# Patient Record
(demographics unavailable — no encounter records)

---

## 2024-11-19 NOTE — REVIEW OF SYSTEMS
[SOB] : shortness of breath [Anxiety] : anxiety [Under Stress] : under stress [Negative] : Heme/Lymph [Weight Loss (___ Lbs)] : [unfilled] ~Ulb weight loss [Weight Gain (___ Lbs)] : no recent weight gain

## 2024-11-19 NOTE — ASSESSMENT
[FreeTextEntry1] : ECG normal sinus rhythm at 79 normal axis intervals no significant ST or T wave changes.  ECG obtained to assist in diagnosis and management of assessed problem(s).  Lab data ------20---3/16/21---21---22--23--2024--10/1/24 Chol--142---------151---------172--------184---------114-------139------144 LDL----69----------78------------92---------99----------27---------58--------69 HDL---56-----------57-----------59---------66----------56---------53--------58 A1C--6.9-----------7.1----------6.5----------------------7.8-------7.6--------7.5 Creat. 0.70  ECHOCARDIOGRAM 2024: Normal LV thickness and systolic function, LVEF 65% No regional WMA Normal RV, LA, RA  EXERCISE STRESS TEST 2024: 1. The ECG is negative for ischemia. 2. Arrhythmias: No arrhythmia associated with stress. The patient exercised for 6 min 5 sec. achieved 7 METS which is consistent with fair exercise capacity considering age and other clinical characteristics. Normal BP and HR response  CAROTID US 2024: 1. Right: proximal ICA 20-49% stenosis. 2. Left: proximal ICA 20-49% stenosis. 3. Vertebral arteries: antegrade flow bilaterally.  Echocardiogram 3/26/19: Normal LV size and function. No significant valvular abnormalities. No overt evidence of pulmonary hypertension.  Pharmacologic stress : No inducible ischemia.  Stress test 5/10/2022: Time: 6 minutes 10 seconds (7 METS) Heart rate: 151 beats minute parens under 1%) Blood pressure: 160/80-normal EC mm upsloping nondiagnostic changes Duke score: 6: Low risk  Impression: 71-year-old female here for cardiac follow up.   1.  Hypertension: Seems adequately controlled. 2. Echocardiogram shows normal LV function and no significant VHD.  3. STress testing shows no EKG changes to suggest ischemia. Fair exercise capacity. Activity limited by chronic hip pain.  4. HX of recurrent dyspnea. most likely pulm etiology. There is no evidence of fluid overload nor of any obvious heart failure .DX'd with mild asthma and responding to inhalers. 5. Lipids reasonably controlled.  6. Carotid US shows only mild disease.  7.  Mourning appropriately for the loss of her .  Plan: 1. Continue current CV medications at current doses.   2.  Maintain valsartan as blood pressure control seems adequate.  3. Continue regular follow-ups with pulmonology.   4. We will not use a beta-blocker to treat her combined hypertension and nocturnal palpitations as her asthma is sufficient, we active enough to require an inhaler twice a day  5. Pt advised to exercise for at least 30 minutes most days of the week. Any cardiac symptoms such as chest pain, palpitations or new shortness of breath should be reported.  6. Repeat labs prior to follow-up.   Clinical follow-up in 4-6 months or sooner if needed.

## 2024-11-19 NOTE — PHYSICAL EXAM
[FreeTextEntry1] :                    Well appearing and nourished with no obvious deformities or distress.  Eyes:  No conjunctival injection and no xanthelasmas. HEENT:  Normocephalic.Normal oral mucosa. No pallor or cyanosis Neck:  No jugular venous distension. with normal A and V wave forms. No palpable adenopathy. Cardiovascular:  Normal rate and rhythm with normal S1, S2 and a grade 1/6 systolic murmur. Distal arterial pulses are normal. No significant peripheral edema. Pulmonary:  Lungs are clear to auscultation and percussion. Normal respiratory pattern without any accessory muscle use Abdomen:  Soft, non-tender ; no palpable organomegaly or masses. Extremities: No digital clubbing, cyanosis or ischemic changes. Skin:  No skin lesions, rashes, ulcers or xanthomas. Psychiatric:  Alert and oriented to person, place and time. Appropriate mood and affect.

## 2024-11-19 NOTE — HISTORY OF PRESENT ILLNESS
[FreeTextEntry1] : Patient reports feeling well overall. States asthma is well controlled.  Does not check her BP at home but tolerating CV medications well. She denies any chest pain, dizziness, syncope, near-syncope, new SOB, edema, orthopnea or PND. Chronic DURAN unchanged in over 6 months.

## 2024-11-19 NOTE — REASON FOR VISIT
[FreeTextEntry1] : RITA JAMISON is a 72 year-old F presents here for cardiac follow-up  Her medical hx is relevant for: 1. Hypertension. 2. Diabetes. 3. Hyperlipidemia.  4. A strong family history of coronary artery disease including a father with MI at 46 and a sister who  of an MI at a young age.   She is mourning the loss of her  Lynn, a longstanding patient of mine, who passed away 3 months ago at home of what sounds like sudden death.

## 2024-11-22 NOTE — HISTORY OF PRESENT ILLNESS
[FreeTextEntry1] : follow up chronic medical conditions  [de-identified] : Ms. RITA JAMISON is a 72 year female with a PMH of anxiety, HLD, DM, HLD, Asthma comes to the office for follow up of chronic medical conditions.

## 2024-11-22 NOTE — HEALTH RISK ASSESSMENT
[No] : In the past 12 months have you used drugs other than those required for medical reasons? No [Former] : Former [15-19] : 15-19 [> 15 Years] : > 15 Years

## 2025-03-28 NOTE — DISCUSSION/SUMMARY
[de-identified] : RITA JAMISON is a 70 year old female who presents with left hip pain s/p left partial hip replacement 17 yrs ago.  She has had complete cartilage erosion of the acetabulum from the hemiarthroplasty.  At this point she would benefit from revision to a total hip replacement.  The patient will be sent for inflammatory marker testing prior to surgery in order to rule out infection, although clinical suspicion is low.   A discussion was had with the patient regarding a left revision total hip replacement. Anterior and posterior exposures were discussed. We will decide ultimate approach once the CT scan is complete to evaluate femoral anteversion.   A long discussion was had with the patient as what the conversion total joint replacement would entail. A model was used to demonstrate the operation and to discuss bearing surfaces of the implants. The hospitalization and rehabilitation were discussed. The use of perioperative antibiotics and DVT prophylaxis were discussed. The risks, benefits and alternatives to surgical intervention were discussed at length with the patient. Specific risks discussed included: infection, wound breakdown, numbness and damage to nerves, tendon, muscle, arteries or other blood vessels, and the possibility of leg length discrepancy. The possibility of recurrent pain, no improvement in pain and actual worsening of the pain were also mentioned in conversation with the patient. Medical complications related to the patient's general medical health including deep vein thrombosis, pulmonary embolism, heart attack, stroke, death and other complications from anesthesia were discussed as well. The patient was told that we will take steps to minimize these risks by using sterile technique, antibiotics and DVT prophylaxis when appropriate and following the patient postoperatively in the clinic setting to monitor progress. The benefits of surgery were discussed with the patient including the potential to improve the current clinical condition through operative intervention. Alternatives to surgical intervention include continued conservative management which may yield less than optimal results in this particular patient. All questions were answered to the satisfaction of the patient. Models were used as an educational tool. We did discuss implant choices and fixation, with shared decision making with the patient.

## 2025-03-28 NOTE — HISTORY OF PRESENT ILLNESS
[de-identified] : Ms. RITA JAMISON is a 72 year old female presenting for evaluation of left hip pain, status post left THR 2007. Patient localizes her pain to the groin and posterolateral aspect of the left hip. Patient denies any falls or trauma.  She has trouble with walking and lifting her leg.  She has trouble putting on socks and shoes.  She denies any fever chills or incisional issues. Patient denies any dislocations or subluxations. Patient takes OTC NSAIDs and Tylenol without relief. She has been going to outpatient PT.  Pmhx: DM 8.1

## 2025-03-28 NOTE — PHYSICAL EXAM
[de-identified] : The patient appears well nourished  and in no apparent distress.  The patient is alert and oriented to person, place, and time.   Affect and mood appear normal. The head is normocephalic and atraumatic.  The eyes reveal normal sclera and extra ocular muscles are intact. The tongue is midline with no apparent lesions.  Skin shows normal turgor with no evidence of eczema or psoriasis.  No respiratory distress noted.  Sensation grossly intact.		  [de-identified] : Exam of the left hip shows hip external rotation of 45 degrees, hip internal rotation of 15 degrees. Her prior incision appears well healed. Left Leg is 1 inch shorter than her right. She has pain with range of motion. 5/5 motor strength bilaterally distally. Sensation intact distally.		  [de-identified] :  X-ray: AP of the pelvis and 2 views of the left hip demonstrate a left partial hip arthroplasty in stable position, with no evidence of fracture, loosening, or dislocation.  There is complete loss of the acetabular cartilage.

## 2025-05-01 NOTE — REVIEW OF SYSTEMS
[Weight Gain (___ Lbs)] : no recent weight gain [Weight Loss (___ Lbs)] : [unfilled] ~Ulb weight loss [SOB] : shortness of breath [Anxiety] : anxiety [Under Stress] : under stress [Negative] : Heme/Lymph

## 2025-05-06 NOTE — REVIEW OF SYSTEMS
[Weight Gain (___ Lbs)] : [unfilled] ~Ulb weight gain [SOB] : shortness of breath [Anxiety] : anxiety [Under Stress] : under stress [Negative] : Heme/Lymph [Weight Loss (___ Lbs)] : no recent weight loss

## 2025-05-06 NOTE — REASON FOR VISIT
[FreeTextEntry1] : RITA JAMISON is a 72 year-old F presents here for cardiac evaluation in anticipation of left hip surgery. Previously had a partial replacement and now will need a revision to a complete replacement.  Her medical hx is relevant for: 1. Hypertension. 2. Diabetes. 3. Hyperlipidemia.  4. A strong family history of coronary artery disease including a father with MI at 46 and a sister who  of an MI at a young age.   She is mourning the loss of her  Lynn, a longstanding patient of mine, who passed away in  at home of what sounds like sudden death.

## 2025-05-06 NOTE — ASSESSMENT
[FreeTextEntry1] :  ECG normal sinus rhythm at 75 normal axis intervals no significant ST or T wave changes.  ECG obtained to assist in diagnosis and management of assessed problem(s).  Lab data ------20---3/16/21---21---22--23--2024--10/1/24--25 Chol--142---------151---------172--------184---------114-------139------144--------177 LDL----69----------78------------92---------99----------27---------58--------69---------69 HDL---56-----------57-----------59---------66----------56---------53--------58---------88 A1C--6.9-----------7.1----------6.5----------------------7.8-------7.6--------7.5--------108 Creat. 0.70  ECHOCARDIOGRAM 2024: Normal LV thickness and systolic function, LVEF 65% No regional WMA Normal RV, LA, RA  EXERCISE STRESS TEST 2024: 1. The ECG is negative for ischemia. 2. Arrhythmias: No arrhythmia associated with stress. The patient exercised for 6 min 5 sec. achieved 7 METS which is consistent with fair exercise capacity considering age and other clinical characteristics. Normal BP and HR response  CAROTID US 2024: 1. Right: proximal ICA 20-49% stenosis. 2. Left: proximal ICA 20-49% stenosis. 3. Vertebral arteries: antegrade flow bilaterally.  Echocardiogram 3/26/19: Normal LV size and function. No significant valvular abnormalities. No overt evidence of pulmonary hypertension.  Pharmacologic stress : No inducible ischemia.  Stress test 5/10/2022: Time: 6 minutes 10 seconds (7 METS) Heart rate: 151 beats minute parens under 1%) Blood pressure: 160/80-normal EC mm upsloping nondiagnostic changes Duke score: 6: Low risk  Impression: 72-year-old female here for cardiac evaluation prior to a left hip surgical procedure  1.  Hypertension: Seems adequately controlled. 2. Echocardiogram shows normal LV function and no significant VHD.  3. STress testing shows no EKG changes to suggest ischemia. Fair exercise capacity. Activity limited by chronic hip pain.  4. HX of recurrent dyspnea. most likely pulm etiology. There is no evidence of fluid overload nor of any obvious heart failure .DX'd with mild asthma and responding to inhalers. 5. Lipids reasonably controlled.  6. Carotid US shows only mild disease.  7.  Mourning appropriately for the loss of her .  Plan: 1.  There is no cardiac contraindication to proceeding with a left hip surgery.  2.  The morning of surgery valsartan will be taken with a sip of water .  3. Continue regular follow-ups with pulmonology.   4. We will not use a beta-blocker to treat her combined hypertension and nocturnal palpitations as her asthma is sufficient, we active enough to require an inhaler twice a day  5.  Repeat labs prior to follow-up.   Clinical follow-up in 6 months or sooner if needed.

## 2025-05-09 NOTE — HISTORY OF PRESENT ILLNESS
[de-identified] : Ms. RITA JAMISON is a 72 year old female presenting for evaluation of right knee pain and swelling. Patient localizes the pain laterally. She notes this began 3 weeks ago without injury. She notes swelling has gone down over that time period. When it first began, she has calf pain and swelling as well which has now resolved. Patient has not had injections. She has tried PT and NSAIDs without improvement.

## 2025-05-09 NOTE — PHYSICAL EXAM
[de-identified] :  The patient appears well nourished and in no apparent distress. The patient is alert and oriented to person, place, and time. Affect and mood appear normal. The head is normocephalic and atraumatic. The eyes reveal normal sclera and extra ocular muscles are intact. The tongue is midline with no apparent lesions. Skin shows normal turgor with no evidence of eczema or psoriasis. No respiratory distress noted. Sensation grossly intact. [de-identified] :  Exam of the right knee shows no palpable abnormality. 5/5 motor strength bilaterally distally. Sensation intact distally. [de-identified] : X-ray: 4 views of the right knee demonstrate moderate varus arthritis

## 2025-05-09 NOTE — DISCUSSION/SUMMARY
[de-identified] :  RITA JAMISON is a 72 year old female who presents with right knee moderate varus arthritis. We discussed that her pain was caused by a flareup of her arthritis. It is now improved.  She will continue to take NSAIDs as needed.

## 2025-06-06 NOTE — PHYSICAL EXAM
[No Acute Distress] : no acute distress [Normal Appearance] : normal appearance [No Neck Mass] : no neck mass [Normal Rate/Rhythm] : normal rate/rhythm [Normal S1, S2] : normal s1, s2 [No Murmurs] : no murmurs [No Resp Distress] : no resp distress [Clear to Auscultation Bilaterally] : clear to auscultation bilaterally [No Abnormalities] : no abnormalities [Benign] : benign [Normal Gait] : normal gait [No Clubbing] : no clubbing [No Cyanosis] : no cyanosis [No Edema] : no edema [FROM] : FROM [Normal Color/ Pigmentation] : normal color/ pigmentation [No Focal Deficits] : no focal deficits [Oriented x3] : oriented x3 [Normal Affect] : normal affect [TextBox_11] : maxillary sinus  tenderness to percussion

## 2025-06-06 NOTE — DISCUSSION/SUMMARY
[Asthma] : asthma [Stable] : stable [Responding to Treatment] : responding to treatment [Mild Persistent] : mild persistent [Acute Sinusitis] : acute sinusitis [Bilateral Maxillary Sinusitis] : bilateral maxillary sinusitis [Mild] : mild [Medication Changes Per Orders] : Medication changes are as documented in orders [Sinus Irrigation] : sinus irrigation [FreeTextEntry1] : No pulmonary contraindication to proposed hip surgery.  Patient does require follow-up of her multiple pulmonary nodules and a follow-up CAT scan has been ordered

## 2025-06-06 NOTE — RESULTS/DATA
[TextEntry] : 10/26/2023: [facility], chest CT with contrast-diffuse bilateral reticulonodular infiltrates.  No evidence of pulmonary emboli 2/19/2024: [facility] radiology-noncontrast chest CT, multiple nodules stable with a new 3 mm pleural-based nodule in the right lower lobe in 2 mm left lower lobe.

## 2025-06-06 NOTE — HISTORY OF PRESENT ILLNESS
[Follow-Up - Routine Clinic] : a routine clinic follow-up of [Facial Pain] : facial pain [Facial Pressure] : facial pressure [Nasal Congestion] : nasal congestion [Postnasal Drainage] : postnasal drainage [Poor Compliance] : poor compliance with treatment [Good Tolerance] : good tolerance of treatment [Good Symptom Control] : good symptom control [None] : The patient is currently asymptomatic [URI] : upper respiratory tract infection [Fever] : no fever [Chills] : no chills [Diplopia] : no diplopia [Cough] : no cough [de-identified] : nasal antihistamines just restarted. Has not started nasal ICS [de-identified] : Sinusitis, pulmonary nodules, hypertension [de-identified] : Pending left hip replacement

## 2025-06-23 NOTE — HISTORY OF PRESENT ILLNESS
[No Pertinent Cardiac History] : no history of aortic stenosis, atrial fibrillation, coronary artery disease, recent myocardial infarction, or implantable device/pacemaker [Asthma] : asthma [No Adverse Anesthesia Reaction] : no adverse anesthesia reaction in self or family member [Chronic Anticoagulation] : no chronic anticoagulation [Chronic Kidney Disease] : no chronic kidney disease [Diabetes] : diabetes [(Patient denies any chest pain, claudication, dyspnea on exertion, orthopnea, palpitations or syncope)] : Patient denies any chest pain, claudication, dyspnea on exertion, orthopnea, palpitations or syncope [Moderate (4-6 METs)] : Moderate (4-6 METs) [FreeTextEntry1] : Left hip surgery [FreeTextEntry2] : 07/02/25 [FreeTextEntry3] : Dr. Brownlee [FreeTextEntry4] : Ms. RITA JAMISON is a 73 year female with a PMH of DM, HLD, HTN comes to the office for preoperative evaluation. Patient denies fever, cough SOB. No other complaints at this time.

## 2025-06-23 NOTE — ASSESSMENT
[Patient Optimized for Surgery] : Patient optimized for surgery [No Further Testing Recommended] : no further testing recommended [Modify anti-platelet treatment prior to procedure] : Modify anti-platelet treatment prior to procedure [Modify medications prior to procedure] : Modify medications prior to procedure [As per surgery] : as per surgery [FreeTextEntry4] : Ms. RITA JAMISON is a 73 year female with a PMH of DM, HLD, HTN comes to the office for preoperative evaluation. Patient denies fever, cough SOB. No other complaints at this time. Patient has greater than 4 METS, is a moderate perioperative risk for Major adverse cardiac event. ECG and blood work reviewed. Cardiologist and Pulmonologist clearance reviewed. No further testing indicated at this time. Patient is medically optimized for this procedure.  [FreeTextEntry6] :  No NSAIDs or Aspirin 5 days prior to procedure  [FreeTextEntry7] : hold Jardiance 5 days prior to procedure.

## 2025-06-23 NOTE — PLAN
[FreeTextEntry1] : Ms. RITA JAMISON is a 73 year female with a PMH of DM, HLD, HTN comes to the office for preoperative evaluation. Patient denies fever, cough SOB. No other complaints at this time. Patient has greater than 4 METS, is a moderate perioperative risk for Major adverse cardiac event. ECG and blood work reviewed. Cardiologist and Pulmonologist clearance reviewed. No further testing indicated at this time. Patient is medically optimized for this procedure.   Prior to appointment and during encounter with patient extensive medical records were reviewed including but not limited to, Hospital records, out patient records, laboratory data and microbiology data    Total encounter total time 30 mins >50% of time spent counseling/coordinating care    Counseling included abnormal lab results, differential diagnoses, treatment options, risks and benefits, lifestyle changes, current condition, medications, and dose adjustments.  The patient was interactive, attentive, asked questions, and verbalized understanding

## 2025-07-24 NOTE — HISTORY OF PRESENT ILLNESS
[Doing Well] : is doing well [Excellent Pain Control] : has excellent pain control [No Sign of Infection] : is showing no signs of infection [de-identified] : S/P: Conversion robotic-assisted posterior left total hip replacement with NAY. DOS: 7/2/2025 [de-identified] : Patient returns 3 weeks following conversion left total hip replacement.  She is overall doing well.  She is ambulating with a cane.  Pain is controlled.  No fevers or chills.  No incisional issues. [de-identified] : Exam of the left hip shows healing incisions with no sign of infection.  No drainage.  She can ambulate well with a cane.  She can do a straight leg raise. [de-identified] : X-ray AP pelvis and 2 views left hip shows a revision left hip replacement in stable position with no fracture or loosening.  No dislocation. [de-identified] : The patient is 3 weeks following conversion left total hip replacement doing well.  She will continue weightbearing as tolerated with posterior hip precautions which were reinforced.  Started outpatient physical therapy and a prescription was provided.  Continue DVT prophylaxis.  Follow-up in 3 weeks.